# Patient Record
Sex: FEMALE | Race: WHITE
[De-identification: names, ages, dates, MRNs, and addresses within clinical notes are randomized per-mention and may not be internally consistent; named-entity substitution may affect disease eponyms.]

---

## 2017-06-14 PROBLEM — Z00.00 ENCOUNTER FOR PREVENTIVE HEALTH EXAMINATION: Status: ACTIVE | Noted: 2017-06-14

## 2017-06-15 ENCOUNTER — APPOINTMENT (OUTPATIENT)
Dept: OTOLARYNGOLOGY | Facility: CLINIC | Age: 32
End: 2017-06-15

## 2017-06-15 VITALS — OXYGEN SATURATION: 97 % | DIASTOLIC BLOOD PRESSURE: 80 MMHG | HEART RATE: 67 BPM | SYSTOLIC BLOOD PRESSURE: 121 MMHG

## 2017-06-15 VITALS — WEIGHT: 198 LBS | BODY MASS INDEX: 32.99 KG/M2 | HEIGHT: 65 IN

## 2017-06-15 DIAGNOSIS — Z78.9 OTHER SPECIFIED HEALTH STATUS: ICD-10-CM

## 2017-06-15 DIAGNOSIS — Z82.3 FAMILY HISTORY OF STROKE: ICD-10-CM

## 2017-06-15 DIAGNOSIS — R49.0 DYSPHONIA: ICD-10-CM

## 2017-06-15 DIAGNOSIS — Z82.2 FAMILY HISTORY OF DEAFNESS AND HEARING LOSS: ICD-10-CM

## 2017-06-15 DIAGNOSIS — Z87.09 PERSONAL HISTORY OF OTHER DISEASES OF THE RESPIRATORY SYSTEM: ICD-10-CM

## 2017-06-15 DIAGNOSIS — Z81.1 FAMILY HISTORY OF ALCOHOL ABUSE AND DEPENDENCE: ICD-10-CM

## 2017-06-15 DIAGNOSIS — Z83.3 FAMILY HISTORY OF DIABETES MELLITUS: ICD-10-CM

## 2017-06-15 DIAGNOSIS — Z81.8 FAMILY HISTORY OF OTHER MENTAL AND BEHAVIORAL DISORDERS: ICD-10-CM

## 2017-06-15 DIAGNOSIS — Z80.9 FAMILY HISTORY OF MALIGNANT NEOPLASM, UNSPECIFIED: ICD-10-CM

## 2017-06-15 RX ORDER — BUPROPION HYDROCHLORIDE 100 MG/1
TABLET, FILM COATED ORAL
Refills: 0 | Status: ACTIVE | COMMUNITY

## 2017-06-15 RX ORDER — OMEPRAZOLE 20 MG/1
20 CAPSULE, DELAYED RELEASE ORAL DAILY
Qty: 30 | Refills: 2 | Status: ACTIVE | COMMUNITY
Start: 2017-06-15 | End: 1900-01-01

## 2017-06-15 RX ORDER — BUPROPION HYDROCHLORIDE 100 MG/1
100 TABLET, FILM COATED, EXTENDED RELEASE ORAL
Qty: 60 | Refills: 0 | Status: ACTIVE | COMMUNITY
Start: 2017-05-12

## 2017-07-24 ENCOUNTER — APPOINTMENT (OUTPATIENT)
Dept: OTOLARYNGOLOGY | Facility: CLINIC | Age: 32
End: 2017-07-24

## 2017-07-27 ENCOUNTER — APPOINTMENT (OUTPATIENT)
Dept: OTOLARYNGOLOGY | Facility: CLINIC | Age: 32
End: 2017-07-27

## 2017-07-28 ENCOUNTER — APPOINTMENT (OUTPATIENT)
Dept: OTOLARYNGOLOGY | Facility: CLINIC | Age: 32
End: 2017-07-28

## 2017-07-31 ENCOUNTER — APPOINTMENT (OUTPATIENT)
Dept: OTOLARYNGOLOGY | Facility: CLINIC | Age: 32
End: 2017-07-31
Payer: COMMERCIAL

## 2017-07-31 VITALS — SYSTOLIC BLOOD PRESSURE: 115 MMHG | DIASTOLIC BLOOD PRESSURE: 78 MMHG | HEART RATE: 61 BPM | OXYGEN SATURATION: 96 %

## 2017-07-31 PROCEDURE — 99214 OFFICE O/P EST MOD 30 MIN: CPT

## 2017-07-31 RX ORDER — OMEPRAZOLE 20 MG/1
20 CAPSULE, DELAYED RELEASE ORAL
Qty: 60 | Refills: 0 | Status: ACTIVE | COMMUNITY
Start: 2017-07-31 | End: 1900-01-01

## 2018-07-23 PROBLEM — Z78.9 ALCOHOL USE: Status: ACTIVE | Noted: 2017-06-15

## 2022-07-14 ENCOUNTER — APPOINTMENT (OUTPATIENT)
Dept: OTOLARYNGOLOGY | Facility: CLINIC | Age: 37
End: 2022-07-14

## 2022-07-14 VITALS
DIASTOLIC BLOOD PRESSURE: 66 MMHG | OXYGEN SATURATION: 98 % | HEIGHT: 65 IN | RESPIRATION RATE: 18 BRPM | BODY MASS INDEX: 31.65 KG/M2 | HEART RATE: 70 BPM | WEIGHT: 190 LBS | TEMPERATURE: 98 F | SYSTOLIC BLOOD PRESSURE: 103 MMHG

## 2022-07-14 DIAGNOSIS — Z87.19 PERSONAL HISTORY OF OTHER DISEASES OF THE DIGESTIVE SYSTEM: ICD-10-CM

## 2022-07-14 DIAGNOSIS — R49.0 DYSPHONIA: ICD-10-CM

## 2022-07-14 DIAGNOSIS — K21.9 GASTRO-ESOPHAGEAL REFLUX DISEASE W/OUT ESOPHAGITIS: ICD-10-CM

## 2022-07-14 PROCEDURE — 99203 OFFICE O/P NEW LOW 30 MIN: CPT | Mod: 25

## 2022-07-14 PROCEDURE — 31575 DIAGNOSTIC LARYNGOSCOPY: CPT

## 2022-07-14 RX ORDER — NORETHINDRONE AND ETHINYL ESTRADIOL 1 MG-35MCG
1-35 KIT ORAL
Qty: 28 | Refills: 0 | Status: ACTIVE | COMMUNITY
Start: 2022-02-01

## 2022-07-14 RX ORDER — HYDROXYZINE HYDROCHLORIDE 25 MG/1
25 TABLET ORAL
Qty: 30 | Refills: 0 | Status: ACTIVE | COMMUNITY
Start: 2022-01-31

## 2022-07-14 RX ORDER — CLOBETASOL PROPIONATE 0.5 MG/G
0.05 OINTMENT TOPICAL
Qty: 60 | Refills: 0 | Status: ACTIVE | COMMUNITY
Start: 2022-01-31

## 2022-07-14 RX ORDER — AZITHROMYCIN 250 MG/1
250 TABLET, FILM COATED ORAL
Qty: 6 | Refills: 0 | Status: ACTIVE | COMMUNITY
Start: 2022-01-28

## 2022-07-14 RX ORDER — OMEPRAZOLE 40 MG/1
40 CAPSULE, DELAYED RELEASE ORAL
Qty: 30 | Refills: 1 | Status: ACTIVE | COMMUNITY
Start: 2022-07-14 | End: 1900-01-01

## 2022-07-14 RX ORDER — FOLLITROPIN 900 [IU]/1.5ML
900 INJECTION, SOLUTION SUBCUTANEOUS
Qty: 1 | Refills: 0 | Status: ACTIVE | COMMUNITY
Start: 2021-09-25

## 2022-07-14 RX ORDER — KETOCONAZOLE 20.5 MG/ML
2 SHAMPOO, SUSPENSION TOPICAL
Qty: 120 | Refills: 0 | Status: ACTIVE | COMMUNITY
Start: 2022-01-31

## 2022-07-14 RX ORDER — ESTRADIOL 0.1 MG/D
0.1 PATCH, EXTENDED RELEASE TRANSDERMAL
Qty: 8 | Refills: 0 | Status: ACTIVE | COMMUNITY
Start: 2022-06-08

## 2022-07-14 RX ORDER — FAMOTIDINE/CA CARB/MAG HYDROX 10-800-165
TABLET,CHEWABLE ORAL
Refills: 0 | Status: ACTIVE | COMMUNITY

## 2022-07-14 NOTE — PROCEDURE
[Hoarseness] : hoarseness not clearly evaluated by indirect laryngoscopy [None] : none [Flexible Endoscope] : examined with the flexible endoscope [Normal] : the false vocal folds were pink and regular, the ventricular sulcus was open, the true vocal folds were glistening white, tense and of equal length, mobility, and height [Serial Number: ___] : Serial Number: [unfilled] [Interarytenoid Edema] : interarytenoid area edematous [de-identified] : done for hoarseness, found to have iah cw rl, minimal roughness on l tvc

## 2022-07-14 NOTE — ASSESSMENT
[FreeTextEntry1] : reflux laryngitis, l vocal cord minimal roughness\par -reassured no nodules, but there may have been one prior\par -diet/ mechanical precautions- information sheet given \par -continue pepcid, added prilosec 40 mg q am\par advised to stay well hydrated\par RTC in 1 month to recheck \par discussed speech therapy but she preferred to hold off for now\par \par SHAHIDA Pope was scribe for this note; it was reviewed and modified as necessary by Nito Cardona MD\par  [id number]

## 2022-07-14 NOTE — PHYSICAL EXAM
[Midline] : trachea located in midline position [Laryngoscopy Performed] : laryngoscopy was performed, see procedure section for findings [Normal] : no neck adenopathy [de-identified] : gait steady

## 2022-07-14 NOTE — HISTORY OF PRESENT ILLNESS
[de-identified] : 36 y/o F last seen in 2017 with h/o reflux laryngitis and vocal cord nodules. Pt is a principal of an elementary school and is presenting with recurrence of hoarseness approximately 1 month ago. This was a second episode. She states she had no voice for two weeks, and it started to improve about a week ago. She is taking pepcid complete. She was treated in 2017 with speech therapy and antireflux meds. As part of her job, she needs to speak to many people at increased volumes. No FH or SH pertinent to cc. Nonsmoker. Denies fevers, chills, sweats.

## 2022-08-25 ENCOUNTER — APPOINTMENT (OUTPATIENT)
Dept: OTOLARYNGOLOGY | Facility: CLINIC | Age: 37
End: 2022-08-25

## 2023-04-06 ENCOUNTER — APPOINTMENT (OUTPATIENT)
Dept: OTOLARYNGOLOGY | Facility: CLINIC | Age: 38
End: 2023-04-06
Payer: COMMERCIAL

## 2023-04-06 VITALS
WEIGHT: 185 LBS | RESPIRATION RATE: 16 BRPM | BODY MASS INDEX: 30.82 KG/M2 | DIASTOLIC BLOOD PRESSURE: 69 MMHG | TEMPERATURE: 97 F | HEIGHT: 65 IN | SYSTOLIC BLOOD PRESSURE: 122 MMHG | OXYGEN SATURATION: 98 % | HEART RATE: 63 BPM

## 2023-04-06 DIAGNOSIS — J00 ACUTE NASOPHARYNGITIS [COMMON COLD]: ICD-10-CM

## 2023-04-06 PROCEDURE — 31575 DIAGNOSTIC LARYNGOSCOPY: CPT

## 2023-04-06 PROCEDURE — 99214 OFFICE O/P EST MOD 30 MIN: CPT | Mod: 25

## 2023-04-06 RX ORDER — AZITHROMYCIN 250 MG/1
250 TABLET, FILM COATED ORAL
Qty: 1 | Refills: 0 | Status: ACTIVE | COMMUNITY
Start: 2023-04-06 | End: 1900-01-01

## 2023-04-06 NOTE — ASSESSMENT
[FreeTextEntry1] : vocal nodules, reflux laryngitis, nasopharyngitis \par -diet/ mechanical precautions -given printed information sheet \par -omeprazole\par -zpack\par -vocal rest\par -voice therapy - given script\par -stay well hydrated\par -rec speaking in a low monotone voice and to avoid yelling/ whispering/ straining voice \par RTC in 3 weeks; call sooner for any issues

## 2023-04-06 NOTE — PROCEDURE
[Hoarseness] : hoarseness not clearly evaluated by indirect laryngoscopy [Flexible Endoscope] : examined with the flexible endoscope [Normal] : the epiglottis was regular without inflammation, lesions or masses, with regular aryepiglottic folds, and a smooth petiolus [None] : none [Serial Number: ___] : Serial Number: [unfilled] [True Vocal Cords Mancera's Nodules] : bilateral true vocal cord nodule(s) [Interarytenoid Edema] : interarytenoid area edematous [de-identified] : done for vocal change\par found to have prominent adenoid with exudate and b vc nodules\par iah

## 2023-04-06 NOTE — HISTORY OF PRESENT ILLNESS
[de-identified] : 9 month followup visit for this 38 y/o F presenting with hoarseness for the past 2.5 weeks. She reports that her voice was completely absent for approximately 1 week. She reports it is improving, but it is not resolved. She feels this might have started with a viral syndrome as other teachers had similar illness but their voices improved rapidly. She had recent strep infxn 2 weeks ago and was treated with amoxicillin. Patient is a principal of an elementary school. She has h/o reflux and had been on prilosec in the past. She missed her followup visit because she had COVID. She restarted taking prilosec for the past 4 days and notices some improvement.

## 2023-04-06 NOTE — PHYSICAL EXAM
[Midline] : trachea located in midline position [Laryngoscopy Performed] : laryngoscopy was performed, see procedure section for findings [Normal] : no nystagmus [de-identified] : gait steady

## 2023-04-14 ENCOUNTER — APPOINTMENT (OUTPATIENT)
Dept: OTOLARYNGOLOGY | Facility: CLINIC | Age: 38
End: 2023-04-14
Payer: COMMERCIAL

## 2023-04-14 PROCEDURE — 92524 BEHAVRAL QUALIT ANALYS VOICE: CPT | Mod: GN

## 2023-04-14 PROCEDURE — 92520 LARYNGEAL FUNCTION STUDIES: CPT | Mod: 59,GN

## 2023-04-14 PROCEDURE — 31579 LARYNGOSCOPY TELESCOPIC: CPT

## 2023-04-27 ENCOUNTER — APPOINTMENT (OUTPATIENT)
Dept: OTOLARYNGOLOGY | Facility: CLINIC | Age: 38
End: 2023-04-27
Payer: COMMERCIAL

## 2023-04-27 VITALS
OXYGEN SATURATION: 99 % | HEART RATE: 56 BPM | HEIGHT: 65 IN | WEIGHT: 186 LBS | SYSTOLIC BLOOD PRESSURE: 138 MMHG | DIASTOLIC BLOOD PRESSURE: 72 MMHG | BODY MASS INDEX: 30.99 KG/M2 | TEMPERATURE: 98 F

## 2023-04-27 PROCEDURE — 99214 OFFICE O/P EST MOD 30 MIN: CPT

## 2023-04-27 NOTE — HISTORY OF PRESENT ILLNESS
[de-identified] : 3 week followup visit for this 36 y/o F with hoarseness. At last visit she was found to have nasopharyngitis, reflux laryngitis, and vocal nodules. The day after we saw her she reports that it was painful to swallow. She took zpack and feels her voice is 75% improved, not back to her baseline. For reflux she is on omeprazole and following dietary/ mechanical precautions. She saw Mr. Olivaston Lucius for an evaluation who recommended 1:1 voice therapy. She feels she is still having reflux symptoms.

## 2023-04-27 NOTE — PHYSICAL EXAM
[Midline] : trachea located in midline position [Laryngoscopy Performed] : laryngoscopy was performed, see procedure section for findings [Normal] : no nystagmus [de-identified] : gait steady

## 2023-04-27 NOTE — PROCEDURE
[Hoarseness] : hoarseness not clearly evaluated by indirect laryngoscopy [Dysphagia] : dysphagia not clearly evaluated by indirect laryngoscopy [Rigid Endoscope] : examined with a rigid endoscope [Serial Number: ___] : Serial Number: [unfilled] [Normal] : the false vocal folds were pink and regular, the ventricular sulcus was open, the true vocal folds were glistening white, tense and of equal length, mobility, and height [Interarytenoid Edema] : interarytenoid area edematous [de-identified] : done to check larynx\par nodules resolved\par mild iah

## 2023-04-27 NOTE — REASON FOR VISIT
FINAL REPORT



EXAM:  XR CHEST 1V AP



HISTORY:  possible Sepsis 



TECHNIQUE:  One view examination of the chest



PRIORS:  4/26/2018



FINDINGS:  

Sternotomy cerclage wires are again present. Interval clearing

left lung. No pneumothorax or left pleural effusion. Normal

cardiac silhouette size without vascular congestion. No acute

skeletal pathology.



Interval clearing of right pleural effusion. There is new or

residual consolidation in the right lung base above the right CP

angle. Linear densities projected in right lung apex suggestive

of skin fold artifact. 



IMPRESSION:  

New or residual consolidation in the right lung base above the

right CP angle may be atelectasis, edema, and/or pneumonia.

Consider follow-up in 4-6 weeks to exclude underlying mass



Interval clearing of left lung base opacity



Large lung volumes may reflect pulmonary emphysema [Subsequent Evaluation] : a subsequent evaluation for [FreeTextEntry2] : hoarseness, reflux laryngitis, vocal nodules

## 2023-05-04 ENCOUNTER — APPOINTMENT (OUTPATIENT)
Dept: OTOLARYNGOLOGY | Facility: CLINIC | Age: 38
End: 2023-05-04
Payer: COMMERCIAL

## 2023-05-04 PROCEDURE — 92507 TX SP LANG VOICE COMM INDIV: CPT | Mod: GN

## 2023-06-05 ENCOUNTER — RX RENEWAL (OUTPATIENT)
Age: 38
End: 2023-06-05

## 2023-06-09 ENCOUNTER — APPOINTMENT (OUTPATIENT)
Dept: OTOLARYNGOLOGY | Facility: CLINIC | Age: 38
End: 2023-06-09
Payer: COMMERCIAL

## 2023-06-09 PROCEDURE — 92507 TX SP LANG VOICE COMM INDIV: CPT | Mod: GN

## 2023-06-15 ENCOUNTER — APPOINTMENT (OUTPATIENT)
Dept: OTOLARYNGOLOGY | Facility: CLINIC | Age: 38
End: 2023-06-15

## 2023-06-23 ENCOUNTER — APPOINTMENT (OUTPATIENT)
Dept: OTOLARYNGOLOGY | Facility: CLINIC | Age: 38
End: 2023-06-23
Payer: COMMERCIAL

## 2023-06-23 VITALS
TEMPERATURE: 98 F | HEART RATE: 56 BPM | SYSTOLIC BLOOD PRESSURE: 102 MMHG | OXYGEN SATURATION: 97 % | DIASTOLIC BLOOD PRESSURE: 67 MMHG | HEIGHT: 65 IN | BODY MASS INDEX: 30.99 KG/M2 | WEIGHT: 186 LBS

## 2023-06-23 PROCEDURE — 31579 LARYNGOSCOPY TELESCOPIC: CPT

## 2023-06-23 PROCEDURE — 92507 TX SP LANG VOICE COMM INDIV: CPT | Mod: GN

## 2023-06-23 PROCEDURE — 99214 OFFICE O/P EST MOD 30 MIN: CPT

## 2023-06-24 NOTE — DATA REVIEWED
[de-identified] : review of photos from speech pathologist's FOE 6/9 ia cw reflux (mild); minimal trace of nodule r posterior r vc

## 2023-06-24 NOTE — REASON FOR VISIT
[Subsequent Evaluation] : a subsequent evaluation for [FreeTextEntry2] : hoarseness, h/o vc nodules, reflux laryngitis

## 2023-06-24 NOTE — HISTORY OF PRESENT ILLNESS
[de-identified] : 2 month followup visit for hoarseness. She has h/o rl and vocal nodules. Reports she has not been adhering carefully to diet for reflux recently. Is taking omeprazole 40 mg daily. Is following with SLP for speech therapy. Reports improvement in voice and it sounds much clearer to me. FOE today with SLP shows trace nodule on R TVF but persistent interarytenoid edema, mild erythema. \par

## 2023-06-24 NOTE — ASSESSMENT
[FreeTextEntry1] : big improvement wer vc nodules\par rl\par urged to follow diet mech precautions carefully\par continue omep 40 mg/d\par asked to follow up in 6 weeks to recheck - if improved, consider switch to famotidine

## 2023-06-30 ENCOUNTER — RX RENEWAL (OUTPATIENT)
Age: 38
End: 2023-06-30

## 2023-07-28 ENCOUNTER — RX RENEWAL (OUTPATIENT)
Age: 38
End: 2023-07-28

## 2023-07-28 RX ORDER — OMEPRAZOLE 40 MG/1
40 CAPSULE, DELAYED RELEASE ORAL
Qty: 30 | Refills: 1 | Status: ACTIVE | COMMUNITY
Start: 2023-04-06 | End: 1900-01-01

## 2023-08-08 ENCOUNTER — APPOINTMENT (OUTPATIENT)
Dept: OTOLARYNGOLOGY | Facility: CLINIC | Age: 38
End: 2023-08-08

## 2023-08-25 ENCOUNTER — APPOINTMENT (OUTPATIENT)
Dept: OTOLARYNGOLOGY | Facility: CLINIC | Age: 38
End: 2023-08-25
Payer: COMMERCIAL

## 2023-08-25 VITALS
WEIGHT: 194 LBS | HEART RATE: 67 BPM | DIASTOLIC BLOOD PRESSURE: 73 MMHG | TEMPERATURE: 97.2 F | BODY MASS INDEX: 32.32 KG/M2 | HEIGHT: 65 IN | SYSTOLIC BLOOD PRESSURE: 111 MMHG | OXYGEN SATURATION: 97 %

## 2023-08-25 DIAGNOSIS — K21.9 ACUTE LARYNGITIS: ICD-10-CM

## 2023-08-25 DIAGNOSIS — J04.0 ACUTE LARYNGITIS: ICD-10-CM

## 2023-08-25 DIAGNOSIS — J38.2 NODULES OF VOCAL CORDS: ICD-10-CM

## 2023-08-25 PROCEDURE — 31575 DIAGNOSTIC LARYNGOSCOPY: CPT

## 2023-08-25 PROCEDURE — 99213 OFFICE O/P EST LOW 20 MIN: CPT | Mod: 25

## 2023-08-25 RX ORDER — FAMOTIDINE 40 MG/1
40 TABLET, FILM COATED ORAL
Qty: 90 | Refills: 1 | Status: ACTIVE | COMMUNITY
Start: 2023-08-25 | End: 1900-01-01

## 2023-08-25 NOTE — REASON FOR VISIT
[Subsequent Evaluation] : a subsequent evaluation for [FreeTextEntry2] : vocal nodules/voice changes

## 2023-08-25 NOTE — PROCEDURE
[Hoarseness] : hoarseness not clearly evaluated by indirect laryngoscopy [Topical Lidocaine] : topical lidocaine [Oxymetazoline HCl] : oxymetazoline HCl [Flexible Endoscope] : examined with the flexible endoscope [Serial Number: ___] : Serial Number: [unfilled] [True Vocal Cords Mancera's Nodules] : bilateral true vocal cord nodule(s) [Normal] : posterior cricoid area had healthy pink mucosa in the interarytenoid area and the esophageal inlet [de-identified] : done for increased hoarseness in setting of vocal nodules b recurrent 1 mm anterior 1/3 vocal nodules

## 2023-08-25 NOTE — HISTORY OF PRESENT ILLNESS
[de-identified] : 39 yo f with h/o small vocal nodules and reflux. 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 They but have now improved, but still logan more hoarseness than she had gotten to. She is curious about acid reflux. She is not noticing it. Still on omeprazole.

## 2023-08-25 NOTE — PHYSICAL EXAM
[Laryngoscopy Performed] : laryngoscopy was performed, see procedure section for findings [Normal] : no rashes [de-identified] : gait steady

## 2023-08-25 NOTE — ASSESSMENT
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 Ignacio Kan continue diet [facility] for reflux switch to famotidine as she is not symptomatic and has been on ppi rtc 2 mo

## 2023-11-01 ENCOUNTER — APPOINTMENT (OUTPATIENT)
Dept: OTOLARYNGOLOGY | Facility: CLINIC | Age: 38
End: 2023-11-01

## 2023-12-22 ENCOUNTER — APPOINTMENT (OUTPATIENT)
Dept: OTOLARYNGOLOGY | Facility: CLINIC | Age: 38
End: 2023-12-22
Payer: COMMERCIAL

## 2023-12-22 VITALS
BODY MASS INDEX: 32.32 KG/M2 | HEIGHT: 65 IN | WEIGHT: 194 LBS | TEMPERATURE: 97.3 F | SYSTOLIC BLOOD PRESSURE: 107 MMHG | OXYGEN SATURATION: 98 % | HEART RATE: 72 BPM | DIASTOLIC BLOOD PRESSURE: 68 MMHG

## 2023-12-22 DIAGNOSIS — J38.2 NODULES OF VOCAL CORDS: ICD-10-CM

## 2023-12-22 DIAGNOSIS — R49.8 OTHER VOICE AND RESONANCE DISORDERS: ICD-10-CM

## 2023-12-22 PROCEDURE — 99213 OFFICE O/P EST LOW 20 MIN: CPT | Mod: 25

## 2023-12-22 PROCEDURE — 31575 DIAGNOSTIC LARYNGOSCOPY: CPT

## 2023-12-22 RX ORDER — FAMOTIDINE 40 MG/1
40 TABLET, FILM COATED ORAL
Qty: 180 | Refills: 1 | Status: ACTIVE | COMMUNITY
Start: 2023-12-22 | End: 1900-01-01

## 2023-12-22 RX ORDER — FLUTICASONE PROPIONATE 50 UG/1
50 SPRAY, METERED NASAL DAILY
Qty: 1 | Refills: 2 | Status: ACTIVE | COMMUNITY
Start: 2023-12-22 | End: 1900-01-01

## 2023-12-23 PROBLEM — J38.2 VOCAL CORD NODULE: Status: ACTIVE | Noted: 2023-04-06

## 2023-12-23 PROBLEM — R49.8 IMPAIRED VOCAL QUALITY: Status: ACTIVE | Noted: 2023-04-06

## 2023-12-23 NOTE — HISTORY OF PRESENT ILLNESS
[de-identified] : 4 mo follow up appt for this 37 yo f vocal nodules and reflux laryngitis. She is following reflux precautions and taking pepcid and reports her voice has been clear. She becomes more hoarse after uri.

## 2023-12-23 NOTE — ASSESSMENT
[FreeTextEntry1] : 1) vocal nodules - advised to follow techniques taught by speech therapy - she agrees 2) rl diet [facility] pepcid  rtc 3-4 ,mo

## 2023-12-23 NOTE — PROCEDURE
[Hoarseness] : hoarseness not clearly evaluated by indirect laryngoscopy [Topical Lidocaine] : topical lidocaine [Oxymetazoline HCl] : oxymetazoline HCl [Flexible Endoscope] : examined with the flexible endoscope [Serial Number: ___] : Serial Number: [unfilled] [Normal] : the false vocal folds were pink and regular, the ventricular sulcus was open, the true vocal folds were glistening white, tense and of equal length, mobility, and height [___] : [unfilled]Ucm nodule on the left [Interarytenoid Edema] : interarytenoid area edematous [de-identified] : done for h/o nodules, reflux and vocal changes 1 mm l posterior nodule, white iah cw rl seen

## 2024-03-21 ENCOUNTER — APPOINTMENT (OUTPATIENT)
Dept: OTOLARYNGOLOGY | Facility: CLINIC | Age: 39
End: 2024-03-21